# Patient Record
Sex: FEMALE | Race: WHITE | Employment: FULL TIME | URBAN - METROPOLITAN AREA
[De-identification: names, ages, dates, MRNs, and addresses within clinical notes are randomized per-mention and may not be internally consistent; named-entity substitution may affect disease eponyms.]

---

## 2017-04-20 ENCOUNTER — OFFICE VISIT (OUTPATIENT)
Dept: ONCOLOGY | Age: 62
End: 2017-04-20

## 2017-04-20 VITALS
RESPIRATION RATE: 18 BRPM | WEIGHT: 192 LBS | HEART RATE: 74 BPM | BODY MASS INDEX: 36.28 KG/M2 | TEMPERATURE: 97.1 F | OXYGEN SATURATION: 97 % | SYSTOLIC BLOOD PRESSURE: 151 MMHG | DIASTOLIC BLOOD PRESSURE: 59 MMHG

## 2017-04-20 DIAGNOSIS — Z08 VAGINAL PAP SMEAR FOLLOWING HYSTERECTOMY FOR MALIGNANCY: ICD-10-CM

## 2017-04-20 DIAGNOSIS — Z12.72 SPECIAL SCREENING FOR MALIGNANT NEOPLASMS, VAGINA: ICD-10-CM

## 2017-04-20 DIAGNOSIS — Z85.42 PERSONAL HISTORY OF MALIGNANT NEOPLASM OF OTHER PARTS OF UTERUS: ICD-10-CM

## 2017-04-20 DIAGNOSIS — C54.1 ENDOMETRIAL CANCER (HCC): Primary | ICD-10-CM

## 2017-04-20 DIAGNOSIS — Z90.710 VAGINAL PAP SMEAR FOLLOWING HYSTERECTOMY FOR MALIGNANCY: ICD-10-CM

## 2017-04-20 NOTE — PROGRESS NOTES
[facility]  [address] [place] [address], [address], [address] [phone]  Jing Rowell MD        Patient ID:  Name: Henry Drummond  MRM: [id number]  : 1955/61 y.o. Date: 2017    SUBJECTIVE:    This is a 62 y.o.   female with Stage IA, Grade 1 Endometrial Adenocarcinoma. Pt is status post Total laparoscopic hysterectomy and bilateral salpingo-oophorectomy, Laparoscopic resection of left retroperitoneal mass, Bilateral pelvic lymphadenectomy and Cystoscopy 10/21/14. Last PAP 10/10/16 was satis and neg. Patient is here today for a 6 month f/u. Patient denies any Gyn symptoms. Patient specifically denies any abnormal vaginal bleeding, vaginal discharge, or vaginal irritation. Patient also denies abdominal pain, pelvic pain, or abdominal bloating. Patient is voiding without difficulty and bowel movements are regular. Pathology:    10/23/14    A: UTERUS, BILATERAL OVARIES AND FALLOPIAN TUBES, REMOVAL:  COMPLEX ENDOMETRIAL HYPERPLASIA WITH ATYPIA. MILD CHRONIC CERVICITIS. OVARIES AND FALLOPIAN TUBES WITH NO PATHOLOGIC ABNORMALITY. B: LYMPH NODES, LEFT EXTERNAL ILIAC, DISSECTION:  THREE LYMPH NODES, NEGATIVE FOR MALIGNANCY (0/3). C: LYMPH NODES, LEFT OBTURATOR, DISSECTION:  SIX LYMPH NODES, NEGATIVE FOR MALIGNANCY (0/6). D: LYMPH NODES, RIGHT PELVIC, DISSECTION:  FOUR LYMPH NODES, NEGATIVE FOR MALIGNANCY (0/4). Washings, Pelvic  Satisfactory for evaluation. Chronic inflammation present. No malignant cells found. Complete ROS  All systems were reviewed and are neg. Medications:     Current Outpatient Prescriptions on File Prior to Visit   Medication Sig Dispense Refill    pantoprazole (PROTONIX) 40 mg tablet   6    ibuprofen 200 mg cap Take 200 mg by mouth as needed for Pain.  zolpidem (AMBIEN) 10 mg tablet Take 5 mg by mouth nightly as needed for Sleep.  Indications: SLEEP-ONSET INSOMNIA      pravastatin (PRAVACHOL) 10 mg tablet Take 10 mg by mouth nightly. Indications: HYPERCHOLESTEROLEMIA      cyclobenzaprine (FLEXERIL) 10 mg tablet Take  by mouth three (3) times daily as needed for Muscle Spasm(s).  naproxen sodium (ALEVE) 220 mg cap Take  by mouth as needed for Pain.  lorazepam (ATIVAN) 1 mg tablet Take  by mouth every four (4) hours as needed for Anxiety.  Dexlansoprazole (DEXILANT) 60 mg CpDB Take  by mouth daily. Pt instructed to take am of surgery. Indications: GASTROESOPHAGEAL REFLUX, Duncan's esophagus       No current facility-administered medications on file prior to visit. Past Medical History:   Diagnosis Date    Asthma     Cancer ([facility] 2014    uterine    Gastroparesis     GERD (gastroesophageal reflux disease)     duncan's esophagus    Hiatal hernia     Hypercholesterolemia     Nausea & vomiting     Psychiatric disorder     anxiety     Past Surgical History:   Procedure Laterality Date    HX CHOLECYSTECTOMY      HX DILATION AND CURETTAGE      HX DILATION AND CURETTAGE  9-2014    HX TONSILLECTOMY  1962    HX TUBAL LIGATION       Family History   Problem Relation Age of Onset    Cancer Mother     Cancer Father     Heart Disease Father     Breast Problems Maternal Grandmother      Social History   Substance Use Topics    Smoking status: Former Smoker     Types: Cigarettes     Quit date: 10/14/1998    Smokeless tobacco: Never Used    Alcohol use Yes      Comment: once every 2 weeks       Allergies:      Allergies   Allergen Reactions    Acyclovir Hives    Keflex [Cephalexin] Hives and Itching       OBJECTIVE:    Vitals:   Visit Vitals    /59    Pulse 74    Temp 97.1 °F (36.2 °C) (Oral)    Resp 18    Wt 87.1 kg (192 lb)    SpO2 97%    BMI 36.28 kg/m2       Physical Examination:    General:  alert, cooperative, no distress   Lungs: lungs clear to auscultation   Cardiac: Regular rate and rhythm   Abdomen: soft, bowel sounds active, non-tender   Incision: Scaly lesion with erythematous border around LLQ incision   Pelvic: NEFG, normal vaginal mucosa without discharge, vaginal cytology performed, cervix uterus and adnexa surgically absent   Rectal: not done   Extremity:   RLE lymphedema. No swelling, warmth, erythema, pulses palpable. LLE normal, atraumatic, no cyanosis or edema     IMPRESSION/PLAN:    Stage IA, Grade 1 Endometrioid adenocarcinoma, JOI  No residual malignancy on final path. Pelvic and cytology performed today. Follow up in 6 months for endometrial cancer surveillance. Patient verbalized understanding of information provided today. All questions answered. Patient agrees with plan of care. The patient is advised to call our office with any problems or concerns. Karlee eDan.  Francisco DALE  Gynecologic Oncology  4/20/2017/2:13 PM

## 2017-04-20 NOTE — MR AVS SNAPSHOT
Visit Information Date & Time Provider Department Dept. Phone Encounter #  
 4/20/2017  9:15 AM [id number] [facility] [phone] Upcoming Health Maintenance Date Due Hepatitis C Screening 1955 Pneumococcal 19-64 Highest Risk (1 of 3 - PCV13) 8/24/1975 DTaP/Tdap/Td series (1 - Tdap) 8/24/1977 BREAST CANCER SCRN MAMMOGRAM 8/24/2006 FOBT Q 1 YEAR AGE 50-75 8/24/2006 INFLUENZA AGE 9 TO ADULT 8/1/2016 ZOSTER VACCINE AGE 60> 8/24/2016 PAP AKA CERVICAL CYTOLOGY 10/10/2019 Allergies as of 4/20/2017  Review Complete On: 4/20/2017 By: Bill Bosch LPN Severity Noted Reaction Type Reactions Acyclovir  10/06/2014   Topical Hives Keflex [Cephalexin]  10/06/2014   Topical Hives, Itching Current Immunizations  Never Reviewed No immunizations on file. Not reviewed this visit You Were Diagnosed With   
  
 Codes Comments [facility] ([facility]    -  Primary ICD-10-CM: C54.1 ICD-9-CM: 182.0 Special screening for malignant neoplasms, vagina     ICD-10-CM: Z12.72 
ICD-9-CM: V76.47 Vaginal Pap smear following hysterectomy for malignancy     ICD-10-CM: Z08, Z90.710 ICD-9-CM: V67.01 Personal history of malignant neoplasm of other parts of uterus     ICD-10-CM: Z85.42 
ICD-9-CM: V10.42 Vitals BP Pulse Temp Resp Weight(growth percentile) SpO2  
 151/59 74 97.1 °F (36.2 °C) (Oral) 18 192 lb (87.1 kg) 97% BMI OB Status Smoking Status 36.28 kg/m2 Hysterectomy Former Smoker BMI and BSA Data Body Mass Index Body Surface Area  
 36.28 kg/m 2 1.94 m 2 Preferred Pharmacy Pharmacy Name Phone [id number] - [place], [address] [phone] Your Updated Medication List  
  
   
This list is accurate as of: 4/20/17 10:00 AM.  Always use your most recent med list.  
  
  
  
  
 ALEVE 220 mg Cap Generic drug:  naproxen sodium Take  by mouth as needed for Pain. AMBIEN 10 mg tablet Generic drug:  zolpidem Take 5 mg by mouth nightly as needed for Sleep. Indications: SLEEP-ONSET INSOMNIA DEXILANT 60 mg Cpdb Generic drug:  Dexlansoprazole Take  by mouth daily. Pt instructed to take am of surgery. Indications: GASTROESOPHAGEAL REFLUX, Em's esophagus FLEXERIL 10 mg tablet Generic drug:  cyclobenzaprine Take  by mouth three (3) times daily as needed for Muscle Spasm(s). ibuprofen 200 mg Cap Take 200 mg by mouth as needed for Pain. LORazepam 1 mg tablet Commonly known as:  ATIVAN Take  by mouth every four (4) hours as needed for Anxiety. pantoprazole 40 mg tablet Commonly known as:  PROTONIX  
  
 PRAVACHOL 10 mg tablet Generic drug:  pravastatin Take 10 mg by mouth nightly. Indications: HYPERCHOLESTEROLEMIA To-Do List   
 04/20/2017 Pathology:  PAP (IMAGE GUIDED), LIQUID-BASED Introducing [facility]! Dear Piero Garduno: 
Thank you for requesting a [employer] account. Our records indicate that you already have an active [employer] account. You can access your account anytime at [email or web address] Did you know that you can access your hospital and ER discharge instructions at any time in [employer]? You can also review all of your test results from your hospital stay or ER visit. Additional Information If you have questions, please visit the Frequently Asked Questions section of the [employer] website at [email or web address] Remember, [employer] is NOT to be used for urgent needs. For medical emergencies, dial 911. Now available from your iPhone and Android! Please provide this summary of care documentation to your next provider. Your primary care clinician is listed as [employer]. If you have any questions after today's visit, please call [phone].

## 2017-04-24 LAB
CYTOLOGIST CVX/VAG CYTO: NORMAL
CYTOLOGY CVX/VAG DOC THIN PREP: NORMAL
Lab: NORMAL
OTHER STN SPEC: NORMAL
PATH REPORT.FINAL DX SPEC: NORMAL
STAT OF ADQ CVX/VAG CYTO-IMP: NORMAL

## 2017-09-12 ENCOUNTER — OFFICE VISIT (OUTPATIENT)
Dept: ONCOLOGY | Age: 62
End: 2017-09-12

## 2017-09-12 VITALS
HEIGHT: 61 IN | HEART RATE: 71 BPM | BODY MASS INDEX: 37 KG/M2 | WEIGHT: 196 LBS | SYSTOLIC BLOOD PRESSURE: 137 MMHG | DIASTOLIC BLOOD PRESSURE: 80 MMHG | OXYGEN SATURATION: 100 % | TEMPERATURE: 96.3 F

## 2017-09-12 DIAGNOSIS — Z85.42 HISTORY OF ENDOMETRIAL CANCER: Primary | ICD-10-CM

## 2017-09-12 RX ORDER — LEVOCETIRIZINE DIHYDROCHLORIDE 5 MG/1
TABLET, FILM COATED ORAL
COMMUNITY

## 2017-09-12 NOTE — PROGRESS NOTES
[facility]  [address] [place] [address], [address], [address] [phone]  Joie Lyons DO        Patient ID:  Name: Yefri Edge  MRM: [id number]  : 1955/62 y.o. Date: 2017    SUBJECTIVE:    This is a 65 y.o.   female with Stage IA, Grade 1 Endometrial Adenocarcinoma. Pt is status post Total laparoscopic hysterectomy and bilateral salpingo-oophorectomy, Laparoscopic resection of left retroperitoneal mass, Bilateral pelvic lymphadenectomy and Cystoscopy 10/21/14. Patient is here today for a 6 month f/u. Patient denies any Gyn symptoms. Patient specifically denies any abnormal vaginal bleeding, vaginal discharge, or vaginal irritation. Patient also denies abdominal pain, pelvic pain, or abdominal bloating. Patient is voiding without difficulty and bowel movements are regular. Pt states her leg swelling has improved    Pathology:    10/23/14    A: UTERUS, BILATERAL OVARIES AND FALLOPIAN TUBES, REMOVAL:  COMPLEX ENDOMETRIAL HYPERPLASIA WITH ATYPIA. MILD CHRONIC CERVICITIS. OVARIES AND FALLOPIAN TUBES WITH NO PATHOLOGIC ABNORMALITY. B: LYMPH NODES, LEFT EXTERNAL ILIAC, DISSECTION:  THREE LYMPH NODES, NEGATIVE FOR MALIGNANCY (0/3). C: LYMPH NODES, LEFT OBTURATOR, DISSECTION:  SIX LYMPH NODES, NEGATIVE FOR MALIGNANCY (0/6). D: LYMPH NODES, RIGHT PELVIC, DISSECTION:  FOUR LYMPH NODES, NEGATIVE FOR MALIGNANCY (0/4). Washings, Pelvic  Satisfactory for evaluation. Chronic inflammation present. No malignant cells found. Complete ROS  All systems were reviewed and are neg. Medications:     Current Outpatient Prescriptions on File Prior to Visit   Medication Sig Dispense Refill    pantoprazole (PROTONIX) 40 mg tablet   6    ibuprofen 200 mg cap Take 200 mg by mouth as needed for Pain.  zolpidem (AMBIEN) 10 mg tablet Take 5 mg by mouth nightly as needed for Sleep.  Indications: SLEEP-ONSET INSOMNIA  Dexlansoprazole (DEXILANT) 60 mg CpDB Take  by mouth daily. Pt instructed to take am of surgery. Indications: GASTROESOPHAGEAL REFLUX, Duncan's esophagus      pravastatin (PRAVACHOL) 10 mg tablet Take 10 mg by mouth nightly. Indications: HYPERCHOLESTEROLEMIA      cyclobenzaprine (FLEXERIL) 10 mg tablet Take  by mouth three (3) times daily as needed for Muscle Spasm(s).  naproxen sodium (ALEVE) 220 mg cap Take  by mouth as needed for Pain.  lorazepam (ATIVAN) 1 mg tablet Take  by mouth every four (4) hours as needed for Anxiety. No current facility-administered medications on file prior to visit. Past Medical History:   Diagnosis Date    Asthma     Cancer ([facility] 2014    uterine    Gastroparesis     GERD (gastroesophageal reflux disease)     duncan's esophagus    Hiatal hernia     Hypercholesterolemia     Nausea & vomiting     Psychiatric disorder     anxiety     Past Surgical History:   Procedure Laterality Date    HX CHOLECYSTECTOMY      HX DILATION AND CURETTAGE      HX DILATION AND CURETTAGE  9-2014    HX TONSILLECTOMY  1962    HX TUBAL LIGATION       Family History   Problem Relation Age of Onset    Cancer Mother     Cancer Father     Heart Disease Father     Breast Problems Maternal Grandmother      Social History   Substance Use Topics    Smoking status: Former Smoker     Types: Cigarettes     Quit date: 10/14/1998    Smokeless tobacco: Never Used    Alcohol use Yes      Comment: once every 2 weeks       Allergies: Allergies   Allergen Reactions    Acyclovir Hives    Keflex [Cephalexin] Hives and Itching       OBJECTIVE:    Vitals: There were no vitals taken for this visit.     Physical Examination:    General:  alert, cooperative, no distress   Lungs: lungs clear to auscultation   Cardiac: Regular rate and rhythm   Abdomen: soft, bowel sounds active, non-tender   Incision: Scaly lesion with erythematous border around LLQ incision   Pelvic: NEFG, normal vaginal mucosa without discharge, vaginal cytology performed, cervix uterus and adnexa surgically absent   Rectal: not done   Extremity:   RLE lymphedema improved No swelling, warmth, erythema, pulses palpable. LLE normal, atraumatic, no cyanosis or edema     IMPRESSION/PLAN:    Stage IA, Grade 1 Endometrioid adenocarcinoma, JOI  No residual malignancy on final path. Follow up in 6 months for endometrial cancer surveillance. Patient verbalized understanding of information provided today. All questions answered. Patient agrees with plan of care. The patient is advised to call our office with any problems or concerns. Total time spent was 25 minutes regarding diagnosis of endometrial cancer and >50% of this time was spent counseling and coordinating care.       Pacheco Berry DO  Gynecologic Oncology  9/12/2017/2:13 PM

## 2018-03-12 ENCOUNTER — OFFICE VISIT (OUTPATIENT)
Dept: ONCOLOGY | Age: 63
End: 2018-03-12

## 2018-03-12 VITALS
OXYGEN SATURATION: 92 % | HEIGHT: 61 IN | DIASTOLIC BLOOD PRESSURE: 70 MMHG | WEIGHT: 187.8 LBS | TEMPERATURE: 95.1 F | SYSTOLIC BLOOD PRESSURE: 139 MMHG | HEART RATE: 66 BPM | RESPIRATION RATE: 14 BRPM | BODY MASS INDEX: 35.45 KG/M2

## 2018-03-12 DIAGNOSIS — Z85.42 HISTORY OF ENDOMETRIAL CANCER: Primary | ICD-10-CM

## 2018-03-12 NOTE — PROGRESS NOTES
Mike Carrasco, a 65 y.o. female,  is here for   Chief Complaint   Patient presents with    Uterine Cancer     6 month surveillance       Visit Vitals    /70 (BP 1 Location: Right arm, BP Patient Position: Sitting)    Pulse 66    Temp 95.1 °F (35.1 °C) (Oral)    Resp 14    Ht 5' 1\" (1.549 m)    Wt 85.2 kg (187 lb 12.8 oz)    SpO2 92%    BMI 35.48 kg/m2       Patient denies any persistent or worsening abdominal or pelvic pain. Denies any unusual vaginal bleeding, discharge, irritation, or odor. Denies experiencing any constipation or diarrhea. No burning, discomfort, or irritation with urination. 1. Have you been to the ER, urgent care clinic since your last visit? Hospitalized since your last visit? No    2. Have you seen or consulted any other health care providers outside of the [address] [place] [address] since your last visit? Include any pap smears or colon screening.  No

## 2018-03-12 NOTE — PROGRESS NOTES
[facility]  [address] [place] [address], [address], [address] [place]  [phone]  [facility]          Patient ID:  Name: Arnoldo Lincoln  MRM: [id number]  : 62 y.o. Date: 3/12/2018    SUBJECTIVE:    This is a 65 y.o.   female with Stage IA, Grade 1 Endometrial Adenocarcinoma. Patient is status post Total laparoscopic hysterectomy and bilateral salpingo-oophorectomy, Laparoscopic resection of left retroperitoneal mass, Bilateral pelvic lymphadenectomy and Cystoscopy 10/21/14. Patient is here today for a 6 month surveillance visit. Patient denies any Gyn symptoms. Patient specifically denies any abnormal vaginal bleeding, vaginal discharge, or vaginal irritation. Patient also denies abdominal pain, pelvic pain, or abdominal bloating. Patient is voiding without difficulty and bowel movements are regular. Pathology:    10/23/14    A: UTERUS, BILATERAL OVARIES AND FALLOPIAN TUBES, REMOVAL:  COMPLEX ENDOMETRIAL HYPERPLASIA WITH ATYPIA. MILD CHRONIC CERVICITIS. OVARIES AND FALLOPIAN TUBES WITH NO PATHOLOGIC ABNORMALITY. B: LYMPH NODES, LEFT EXTERNAL ILIAC, DISSECTION:  THREE LYMPH NODES, NEGATIVE FOR MALIGNANCY (0/3). C: LYMPH NODES, LEFT OBTURATOR, DISSECTION:  SIX LYMPH NODES, NEGATIVE FOR MALIGNANCY (0/6). D: LYMPH NODES, RIGHT PELVIC, DISSECTION:  FOUR LYMPH NODES, NEGATIVE FOR MALIGNANCY (0/4). Washings, Pelvic  Satisfactory for evaluation. Chronic inflammation present. No malignant cells found. Complete ROS  All systems were reviewed and are neg. Medications:     Current Outpatient Prescriptions on File Prior to Visit   Medication Sig Dispense Refill    levocetirizine (XYZAL) 5 mg tablet Take  by mouth daily as needed for Allergies.  pantoprazole (PROTONIX) 40 mg tablet   6    ibuprofen 200 mg cap Take 200 mg by mouth as needed for Pain.       pravastatin (PRAVACHOL) 10 mg tablet Take 10 mg by mouth nightly. Indications: HYPERCHOLESTEROLEMIA      cyclobenzaprine (FLEXERIL) 10 mg tablet Take  by mouth three (3) times daily as needed for Muscle Spasm(s).  naproxen sodium (ALEVE) 220 mg cap Take  by mouth as needed for Pain.  lorazepam (ATIVAN) 1 mg tablet Take  by mouth every four (4) hours as needed for Anxiety.  zolpidem (AMBIEN) 10 mg tablet Take 5 mg by mouth nightly as needed for Sleep. Indications: SLEEP-ONSET INSOMNIA      Dexlansoprazole (DEXILANT) 60 mg CpDB Take  by mouth daily. Pt instructed to take am of surgery. Indications: GASTROESOPHAGEAL REFLUX, Duncan's esophagus       No current facility-administered medications on file prior to visit. Past Medical History:   Diagnosis Date    Asthma     Cancer ([facility] 2014    uterine    Gastroparesis     GERD (gastroesophageal reflux disease)     duncan's esophagus    Hiatal hernia     Hypercholesterolemia     Nausea & vomiting     Psychiatric disorder     anxiety    Sinusitis 02/2017     Past Surgical History:   Procedure Laterality Date    HX CHOLECYSTECTOMY      HX DILATION AND CURETTAGE      HX DILATION AND CURETTAGE  9-2014    HX TONSILLECTOMY  1962    HX TUBAL LIGATION       Family History   Problem Relation Age of Onset    Cancer Mother     Cancer Father     Heart Disease Father     Breast Problems Maternal Grandmother      Social History   Substance Use Topics    Smoking status: Former Smoker     Types: Cigarettes     Quit date: 10/14/1998    Smokeless tobacco: Never Used    Alcohol use No       Allergies:      Allergies   Allergen Reactions    Acyclovir Hives    Keflex [Cephalexin] Hives and Itching       OBJECTIVE:    Vitals:   Visit Vitals    /70 (BP 1 Location: Right arm, BP Patient Position: Sitting)    Pulse 66    Temp 95.1 °F (35.1 °C) (Oral)    Resp 14    Ht 5' 1\" (1.549 m)    Wt 85.2 kg (187 lb 12.8 oz)    LMP  (LMP Unknown)    SpO2 92%    BMI 35.48 kg/m2       Physical Examination:    General:  alert, cooperative, no distress   Lungs: lungs clear to auscultation   Cardiac: Regular rate and rhythm   Abdomen: soft, bowel sounds active, non-tender   Incision: Scaly lesion with erythematous border around LLQ incision   Pelvic: NEFG, normal vaginal mucosa without discharge, vaginal cytology performed, cervix uterus and adnexa surgically absent   Rectal: not done   Extremity:   RLE lymphedema improved No swelling, warmth, erythema, pulses palpable. LLE normal, atraumatic, no cyanosis or edema     IMPRESSION/PLAN:    Stage IA, Grade 1 Endometrioid adenocarcinoma, JOI  No residual malignancy on final path. Follow up in 6 months for endometrial cancer surveillance. Patient verbalized understanding of information provided today. All questions answered. Patient agrees with plan of care. The patient is advised to call our office with any problems or concerns. Total time spent was 25 minutes regarding diagnosis of endometrial cancer and >50% of this time was spent counseling and coordinating care.       Laren Schilder [facility]  Gynecologic Oncology  3/12/2018/3:40 PM

## 2018-03-12 NOTE — PATIENT INSTRUCTIONS
Below is some information on the condition you previously were diagnosed with. Please call the office ASAP if you begin to experience the following symptoms: Persistent or worsening abdominal or pelvic pain, any unusual vaginal bleeding, discharge, odor, or irritation, and any changes in bladder or bowel habits such as constipation, diarrhea, burning, or general discomfort. Please call the office if you have any other questions or concerns. Uterine Cancer: Care Instructions  Your Care Instructions  Uterine cancer is the rapid growth of abnormal cells that line the uterus. It also is called endometrial cancer. These cells may spread to nearby organs, lymph glands, or distant organs. Uterine cancer can be cured most often when found early. Treatment may include surgery to remove the uterus, ovaries, fallopian tubes, and sometimes the pelvic lymph nodes. Radiation and hormones to stop cancer growth also are sometimes used. Chemotherapy may be used if the cancer has spread. Having cancer can be scary. You may feel many emotions and may need some help coping. Seek out family, friends, and counselors for support. You can do things at home to make yourself feel better while you go through treatment. You also can call the [facility] (7-121.755.4197) or visit its website at [address] for more information. Follow-up care is a key part of your treatment and safety. Be sure to make and go to all appointments, and call your doctor if you are having problems. It's also a good idea to know your test results and keep a list of the medicines you take. How can you care for yourself at home? · Take your medicines exactly as prescribed. Call your doctor if you think you are having a problem with your medicine. You may get medicine for nausea and vomiting if you have these side effects. · Eat healthy food.  If you do not feel like eating, try to eat food that has protein and extra calories to keep up your strength and prevent weight loss. Drink liquid meal replacements for extra calories and protein. Try to eat your main meal early. · Get some physical activity every day, but do not get too tired. Keep doing the hobbies you enjoy as your energy allows. · Take steps to control your stress and workload. Learn relaxation techniques. ¨ Share your feelings. Stress and tension affect our emotions. By expressing your feelings to others, you may be able to understand and cope with them. ¨ Consider joining a support group. Talking about a problem with your spouse, a good friend, or other people with similar problems is a good way to reduce tension and stress. ¨ Express yourself through art. Try writing, dance, art, or crafts to relieve tension. Some dance, writing, or art groups may be available just for people who have cancer. ¨ Be kind to your body and mind. Getting enough sleep, eating a healthy diet, and taking time to do things you enjoy can contribute to an overall feeling of balance in your life and help reduce stress. ¨ Get help if you need it. Discuss your concerns with your doctor or counselor. · If you are vomiting or have diarrhea:  ¨ Drink plenty of fluids (enough so that your urine is light yellow or clear like water) to prevent dehydration. Choose water and other caffeine-free clear liquids. If you have kidney, heart, or liver disease and have to limit fluids, talk with your doctor before you increase the amount of fluids you drink. ¨ When you are able to eat, try clear soups, mild foods, and liquids until all symptoms are gone for 12 to 48 hours. Other good choices include dry toast, crackers, cooked cereal, and gelatin dessert, such as Jell-O.  · Take care of your urinary tract to prevent problems such as infection, which can be caused by uterine cancer and its treatment. Limit drinks with caffeine, drink plenty of fluids, and urinate every 3 to 4 hours.   · If you have not already done so, prepare a list of advance directives. Advance directives are instructions to your doctor and family members about what kind of care you want if you become unable to speak or express yourself. When should you call for help? Call 911 anytime you think you may need emergency care. For example, call if:  ? · You passed out (lost consciousness). ?Call your doctor now or seek immediate medical care if:  ? · You have a fever. ? · You have abnormal bleeding. ? · You have new or worse pain. ? · You think you have an infection. ? · You have new symptoms, such as a cough, belly pain, vomiting, diarrhea, or a rash. ? Watch closely for changes in your health, and be sure to contact your doctor if:  ? · You are much more tired than usual.   ? · You have swollen glands in your armpits, groin, or neck. ? · You do not get better as expected. Where can you learn more? Go to http://cori-penelope.info/. Enter E343 in the search box to learn more about \"Uterine Cancer: Care Instructions. \"  Current as of: May 12, 2017  Content Version: 11.4  © 7145-8105 "[employer]. Care instructions adapted under license by [employer] (which disclaims liability or warranty for this information). If you have questions about a medical condition or this instruction, always ask your healthcare professional. [address] any warranty or liability for your use of this information.

## 2018-09-10 ENCOUNTER — OFFICE VISIT (OUTPATIENT)
Dept: ONCOLOGY | Age: 63
End: 2018-09-10

## 2018-09-10 VITALS
WEIGHT: 186.4 LBS | OXYGEN SATURATION: 99 % | TEMPERATURE: 96.6 F | HEART RATE: 71 BPM | SYSTOLIC BLOOD PRESSURE: 139 MMHG | DIASTOLIC BLOOD PRESSURE: 76 MMHG | RESPIRATION RATE: 18 BRPM | BODY MASS INDEX: 35.19 KG/M2 | HEIGHT: 61 IN

## 2018-09-10 DIAGNOSIS — Z85.42 HISTORY OF ENDOMETRIAL CANCER: Primary | ICD-10-CM

## 2018-09-10 PROBLEM — E66.01 SEVERE OBESITY (BMI 35.0-39.9): Status: ACTIVE | Noted: 2018-09-10

## 2018-09-10 NOTE — PATIENT INSTRUCTIONS
Below you will find information on the condition you were previously diagnosed with. Please call the office as soon as possible if you begin to experience the following symptoms: Persistent or worsening abdominal or pelvic pain, any unusual vaginal bleeding, discharge, odor, or irritation, and any changes in bladder or bowel habits such as constipation, diarrhea, burning, or general discomfort. Please call the office if you have any other questions or concerns. Uterine Cancer: Care Instructions  Your Care Instructions  Uterine cancer is the rapid growth of abnormal cells that line the uterus. It also is called endometrial cancer. These cells may spread to nearby organs, lymph glands, or distant organs. Uterine cancer can be cured most often when found early. Treatment may include surgery to remove the uterus, ovaries, fallopian tubes, and sometimes the pelvic lymph nodes. Radiation and hormones to stop cancer growth also are sometimes used. Chemotherapy may be used if the cancer has spread. Having cancer can be scary. You may feel many emotions and may need some help coping. Seek out family, friends, and counselors for support. You can do things at home to make yourself feel better while you go through treatment. You also can call the [facility] (4-695.224.8309) or visit its website at [address] [employer]. [place] for more information. Follow-up care is a key part of your treatment and safety. Be sure to make and go to all appointments, and call your doctor if you are having problems. It's also a good idea to know your test results and keep a list of the medicines you take. How can you care for yourself at home? · Take your medicines exactly as prescribed. Call your doctor if you think you are having a problem with your medicine. You may get medicine for nausea and vomiting if you have these side effects. · Eat healthy food.  If you do not feel like eating, try to eat food that has protein and extra calories to keep up your strength and prevent weight loss. Drink liquid meal replacements for extra calories and protein. Try to eat your main meal early. · Get some physical activity every day, but do not get too tired. Keep doing the hobbies you enjoy as your energy allows. · Take steps to control your stress and workload. Learn relaxation techniques. ¨ Share your feelings. Stress and tension affect our emotions. By expressing your feelings to others, you may be able to understand and cope with them. ¨ Consider joining a support group. Talking about a problem with your spouse, a good friend, or other people with similar problems is a good way to reduce tension and stress. ¨ Express yourself through art. Try writing, dance, art, or crafts to relieve tension. Some dance, writing, or art groups may be available just for people who have cancer. ¨ Be kind to your body and mind. Getting enough sleep, eating a healthy diet, and taking time to do things you enjoy can contribute to an overall feeling of balance in your life and help reduce stress. ¨ Get help if you need it. Discuss your concerns with your doctor or counselor. · If you are vomiting or have diarrhea:  ¨ Drink plenty of fluids (enough so that your urine is light yellow or clear like water) to prevent dehydration. Choose water and other caffeine-free clear liquids. If you have kidney, heart, or liver disease and have to limit fluids, talk with your doctor before you increase the amount of fluids you drink. ¨ When you are able to eat, try clear soups, mild foods, and liquids until all symptoms are gone for 12 to 48 hours. Other good choices include dry toast, crackers, cooked cereal, and gelatin dessert, such as Jell-O.  · Take care of your urinary tract to prevent problems such as infection, which can be caused by uterine cancer and its treatment. Limit drinks with caffeine, drink plenty of fluids, and urinate every 3 to 4 hours.   · If you have not already done so, prepare a list of advance directives. Advance directives are instructions to your doctor and family members about what kind of care you want if you become unable to speak or express yourself. When should you call for help? Call 911 anytime you think you may need emergency care. For example, call if:    · You passed out (lost consciousness).    Call your doctor now or seek immediate medical care if:    · You have a fever.     · You have abnormal bleeding.     · You have new or worse pain.     · You think you have an infection.     · You have new symptoms, such as a cough, belly pain, vomiting, diarrhea, or a rash.    Watch closely for changes in your health, and be sure to contact your doctor if:    · You are much more tired than usual.     · You have swollen glands in your armpits, groin, or neck.     · You do not get better as expected. Where can you learn more? Go to http://cori-penelope.info/. Enter E343 in the search box to learn more about \"Uterine Cancer: Care Instructions. \"  Current as of: May 12, 2017  Content Version: 11.7  © 0017-0972 [employer]. Care instructions adapted under license by [employer] (which disclaims liability or warranty for this information). If you have questions about a medical condition or this instruction, always ask your healthcare professional. [address] any warranty or liability for your use of this information.

## 2018-09-10 NOTE — MR AVS SNAPSHOT
[facility] 
 
 
 [facility] [address] [place] [address] 
[phone] Patient: Felix Whitman MRN: [id number] Visit Information Date & Time Provider Department Dept. Phone Encounter #  
 9/10/2018  3:30 PM Hernan Canales NP [facility] [phone] [id number] Follow-up Instructions Return in about 6 months (around 3/10/2019). Follow-up and Disposition History Your Appointments 3/11/2019  3:30 PM  
Office Visit with Hernan Canales NP [facility] ([facility]) Appt Note: 6 m f/u, pt requested 330 or 4 pm  
 [address] Upcoming Health Maintenance Date Due Hepatitis C Screening 1955 Pneumococcal 19-64 Highest Risk (1 of 3 - PCV13) 8/24/1975 DTaP/Tdap/Td series (1 - Tdap) 8/24/1977 BREAST CANCER SCRN MAMMOGRAM 8/24/2006 FOBT Q 1 YEAR AGE 50-75 8/24/2006 ZOSTER VACCINE AGE 60> 6/24/2016 Influenza Age 5 to Adult 8/1/2018 PAP AKA CERVICAL CYTOLOGY 4/21/2020 Allergies as of 9/10/2018  Review Complete On: 9/10/2018 By: Hernan Canales NP Severity Noted Reaction Type Reactions Acyclovir  10/06/2014   Topical Hives Keflex [Cephalexin]  10/06/2014   Topical Hives, Itching Current Immunizations  Never Reviewed No immunizations on file. Not reviewed this visit You Were Diagnosed With   
  
 Codes Comments History of endometrial cancer    -  Primary ICD-10-CM: Z85.42 
ICD-9-CM: V10.42 Vitals BP Pulse Temp Resp Height(growth percentile) Weight(growth percentile) 139/76 (BP 1 Location: Right arm, BP Patient Position: Sitting) 71 96.6 °F (35.9 °C) (Oral) 18 5' 1\" (1.549 m) 186 lb 6.4 oz (84.6 kg) LMP SpO2 BMI OB Status Smoking Status (LMP Unknown) 99% 35.22 kg/m2 Hysterectomy Former Smoker BMI and BSA Data Body Mass Index Body Surface Area  
 35.22 kg/m 2 1.91 m 2 Your Updated Medication List  
  
   
This list is accurate as of 9/10/18  4:21 PM.  Always use your most recent med list.  
  
  
  
  
 ALEVE 220 mg Cap Generic drug:  naproxen sodium Take  by mouth as needed for Pain. FLEXERIL 10 mg tablet Generic drug:  cyclobenzaprine Take  by mouth three (3) times daily as needed for Muscle Spasm(s). pantoprazole 40 mg tablet Commonly known as:  PROTONIX  
  
 PRAVACHOL 10 mg tablet Generic drug:  pravastatin Take 10 mg by mouth nightly. Indications: HYPERCHOLESTEROLEMIA  
  
 XYZAL 5 mg tablet Generic drug:  levocetirizine Take  by mouth daily as needed for Allergies. Follow-up Instructions Return in about 6 months (around 3/10/2019). Patient Instructions Below you will find information on the condition you were previously diagnosed with. Please call the office as soon as possible if you begin to experience the following symptoms: Persistent or worsening abdominal or pelvic pain, any unusual vaginal bleeding, discharge, odor, or irritation, and any changes in bladder or bowel habits such as constipation, diarrhea, burning, or general discomfort. Please call the office if you have any other questions or concerns. Uterine Cancer: Care Instructions Your Care Instructions Uterine cancer is the rapid growth of abnormal cells that line the uterus. It also is called endometrial cancer. These cells may spread to nearby organs, lymph glands, or distant organs. Uterine cancer can be cured most often when found early. Treatment may include surgery to remove the uterus, ovaries, fallopian tubes, and sometimes the pelvic lymph nodes. Radiation and hormones to stop cancer growth also are sometimes used. Chemotherapy may be used if the cancer has spread. Having cancer can be scary. You may feel many emotions and may need some help coping. Seek out family, friends, and counselors for support. You can do things at home to make yourself feel better while you go through treatment. You also can call the [facility] (2-516.202.6877) or visit its website at [address]. [employer] for more information. Follow-up care is a key part of your treatment and safety. Be sure to make and go to all appointments, and call your doctor if you are having problems. It's also a good idea to know your test results and keep a list of the medicines you take. How can you care for yourself at home? · Take your medicines exactly as prescribed. Call your doctor if you think you are having a problem with your medicine. You may get medicine for nausea and vomiting if you have these side effects. · Eat healthy food. If you do not feel like eating, try to eat food that has protein and extra calories to keep up your strength and prevent weight loss. Drink liquid meal replacements for extra calories and protein. Try to eat your main meal early. · Get some physical activity every day, but do not get too tired. Keep doing the hobbies you enjoy as your energy allows. · Take steps to control your stress and workload. Learn relaxation techniques. ¨ Share your feelings. Stress and tension affect our emotions. By expressing your feelings to others, you may be able to understand and cope with them. ¨ Consider joining a support group. Talking about a problem with your spouse, a good friend, or other people with similar problems is a good way to reduce tension and stress. ¨ Express yourself through art. Try writing, dance, art, or crafts to relieve tension. Some dance, writing, or art groups may be available just for people who have cancer. ¨ Be kind to your body and mind.  Getting enough sleep, eating a healthy diet, and taking time to do things you enjoy can contribute to an overall feeling of balance in your life and help reduce stress. ¨ Get help if you need it. Discuss your concerns with your doctor or counselor. · If you are vomiting or have diarrhea: ¨ Drink plenty of fluids (enough so that your urine is light yellow or clear like water) to prevent dehydration. Choose water and other caffeine-free clear liquids. If you have kidney, heart, or liver disease and have to limit fluids, talk with your doctor before you increase the amount of fluids you drink. ¨ When you are able to eat, try clear soups, mild foods, and liquids until all symptoms are gone for 12 to 48 hours. Other good choices include dry toast, crackers, cooked cereal, and gelatin dessert, such as Jell-O. 
· Take care of your urinary tract to prevent problems such as infection, which can be caused by uterine cancer and its treatment. Limit drinks with caffeine, drink plenty of fluids, and urinate every 3 to 4 hours. · If you have not already done so, prepare a list of advance directives. Advance directives are instructions to your doctor and family members about what kind of care you want if you become unable to speak or express yourself. When should you call for help? Call 911 anytime you think you may need emergency care. For example, call if: 
  · You passed out (lost consciousness).  
 Call your doctor now or seek immediate medical care if: 
  · You have a fever.  
  · You have abnormal bleeding.  
  · You have new or worse pain.  
  · You think you have an infection.  
  · You have new symptoms, such as a cough, belly pain, vomiting, diarrhea, or a rash.  
 Watch closely for changes in your health, and be sure to contact your doctor if: 
  · You are much more tired than usual.  
  · You have swollen glands in your armpits, groin, or neck.  
  · You do not get better as expected. Where can you learn more? Go to http://cori-penelope.info/. Enter E343 in the search box to learn more about \"Uterine Cancer: Care Instructions. \" Current as of: May 12, 2017 Content Version: 11.7 © 9255-7836 [employer]. Care instructions adapted under license by [employer] (which disclaims liability or warranty for this information). If you have questions about a medical condition or this instruction, always ask your healthcare professional. [address] any warranty or liability for your use of this information. Patient Instructions History Introducing [facility]! Dear Olesya Perez: 
Thank you for requesting a [employer] account. Our records indicate that you already have an active [employer] account. You can access your account anytime at [email or web address] - [email or web address] for the rest of us. [email or web address] for the rest of us Did you know that you can access your hospital and ER discharge instructions at any time in [employer]? You can also review all of your test results from your hospital stay or ER visit. Additional Information If you have questions, please visit the Frequently Asked Questions section of the [employer] website at [email or web address] for the rest of us/. Remember, [employer] is NOT to be used for urgent needs. For medical emergencies, dial 911. Now available from your iPhone and Android! Please provide this summary of care documentation to your next provider. Your primary care clinician is listed as [employer]. If you have any questions after today's visit, please call [phone].

## 2018-09-10 NOTE — PROGRESS NOTES
Irais Gallagher, a 59 y.o. female,  is here for   Chief Complaint   Patient presents with    Uterine Cancer     6 month surveillance       Visit Vitals    /76 (BP 1 Location: Right arm, BP Patient Position: Sitting)    Pulse 71    Temp 96.6 °F (35.9 °C) (Oral)    Resp 18    Ht 5' 1\" (1.549 m)    Wt 84.6 kg (186 lb 6.4 oz)    SpO2 99%    BMI 35.22 kg/m2       Patient reports, Patient denies any persistent or worsening abdominal or pelvic pain. Denies any unusual vaginal bleeding, discharge, but has noticed irritation when wiping stated \"maybe its my toliet paper brand\". Denies experiencing any constipation or diarrhea. No burning, discomfort, or irritation with urination. .    1. Have you been to the ER, urgent care clinic since your last visit? Hospitalized since your last visit? Yes When: 08/2018 Where: urgrent care, Reason for visit: left hip    2. Have you seen or consulted any other health care providers outside of the [facility] since your last visit? Include any pap smears or colon screening.  Yes When: 08/2018 Where: PCP [facility] Reason for visit: bursitis

## 2018-09-10 NOTE — PROGRESS NOTES
[facility]  [address] [place] [address], [address], [address] [phone]  [facility]          Patient ID:  Name: Kei Hadley  MRM: [id number]  : 1955/63 y.o. Date: 9/10/2018    SUBJECTIVE:    This is a 59 y.o.   female with Stage IA, Grade 1 Endometrial Adenocarcinoma. Patient is status post Total laparoscopic hysterectomy and bilateral salpingo-oophorectomy, Laparoscopic resection of left retroperitoneal mass, Bilateral pelvic lymphadenectomy and Cystoscopy 10/21/14. Patient is here today for a 6 month surveillance visit. She admits to intermittent vulvar/groin irritation which she attributes to her toilet paper. She denies any abnormal vaginal bleeding, vaginal discharge. Patient also denies abdominal pain, pelvic pain, or abdominal bloating. Patient is voiding without difficulty and bowel movements are regular. She recently had cortisone injections for bursitis in left hip. Pathology:    10/23/14    A: UTERUS, BILATERAL OVARIES AND FALLOPIAN TUBES, REMOVAL:  COMPLEX ENDOMETRIAL HYPERPLASIA WITH ATYPIA. MILD CHRONIC CERVICITIS. OVARIES AND FALLOPIAN TUBES WITH NO PATHOLOGIC ABNORMALITY. B: LYMPH NODES, LEFT EXTERNAL ILIAC, DISSECTION:  THREE LYMPH NODES, NEGATIVE FOR MALIGNANCY (0/3). C: LYMPH NODES, LEFT OBTURATOR, DISSECTION:  SIX LYMPH NODES, NEGATIVE FOR MALIGNANCY (0/6). D: LYMPH NODES, RIGHT PELVIC, DISSECTION:  FOUR LYMPH NODES, NEGATIVE FOR MALIGNANCY (0/4). Washings, Pelvic  Satisfactory for evaluation. Chronic inflammation present. No malignant cells found. Complete ROS  As above    Medications:     Current Outpatient Prescriptions on File Prior to Visit   Medication Sig Dispense Refill    levocetirizine (XYZAL) 5 mg tablet Take  by mouth daily as needed for Allergies.       pantoprazole (PROTONIX) 40 mg tablet   6    pravastatin (PRAVACHOL) 10 mg tablet Take 10 mg by mouth nightly. Indications: HYPERCHOLESTEROLEMIA      cyclobenzaprine (FLEXERIL) 10 mg tablet Take  by mouth three (3) times daily as needed for Muscle Spasm(s).  naproxen sodium (ALEVE) 220 mg cap Take  by mouth as needed for Pain.  ibuprofen 200 mg cap Take 200 mg by mouth as needed for Pain.  lorazepam (ATIVAN) 1 mg tablet Take  by mouth every four (4) hours as needed for Anxiety. No current facility-administered medications on file prior to visit. Past Medical History:   Diagnosis Date    Arthritis     had cortisone shots     Asthma     Cancer ([facility] [address] 2014    uterine    Gastroparesis     GERD (gastroesophageal reflux disease)     duncan's esophagus    Hiatal hernia     Hypercholesterolemia     Nausea & vomiting     Psychiatric disorder     anxiety    Sinusitis 02/2017     Past Surgical History:   Procedure Laterality Date    HX CHOLECYSTECTOMY      HX DILATION AND CURETTAGE      HX DILATION AND CURETTAGE  9-2014    HX TONSILLECTOMY  1962    HX TUBAL LIGATION       Family History   Problem Relation Age of Onset    Cancer Mother     Cancer Father     Heart Disease Father     Breast Problems Maternal Grandmother      Social History   Substance Use Topics    Smoking status: Former Smoker     Types: Cigarettes     Quit date: 10/14/1998    Smokeless tobacco: Never Used    Alcohol use No      Comment: occas. 1x a month       Allergies:      Allergies   Allergen Reactions    Acyclovir Hives    Keflex [Cephalexin] Hives and Itching       OBJECTIVE:    Vitals:   Visit Vitals    /76 (BP 1 Location: Right arm, BP Patient Position: Sitting)    Pulse 71    Temp 96.6 °F (35.9 °C) (Oral)    Resp 18    Ht 5' 1\" (1.549 m)    Wt 84.6 kg (186 lb 6.4 oz)    LMP  (LMP Unknown)    SpO2 99%    BMI 35.22 kg/m2       Physical Examination:    General:  alert, cooperative, no distress   Lungs: lungs clear to auscultation   Cardiac: Regular rate and rhythm   Abdomen: soft, bowel sounds active, non-tender   Incision: well healed   Pelvic: NEFG, normal vaginal mucosa without discharge, cervix uterus and adnexa surgically absent   Rectal: not done   Extremity:   RLE lymphedema improved. No swelling, warmth, erythema, pulses palpable. LLE normal, atraumatic, no cyanosis or edema     IMPRESSION/PLAN:    Stage IA, Grade 1 Endometrioid adenocarcinoma, JOI  No residual malignancy on final path. Follow up in 6 months for endometrial cancer surveillance. Reviewed signs and symptoms of recurrence. Patient verbalized understanding of information provided today. All questions answered. Patient agrees with plan of care. The patient is advised to call our office with any problems or concerns. Total time spent was 25 minutes regarding diagnosis of endometrial cancer and >50% of this time was spent counseling and coordinating care.       Hernan Canales [facility]  Gynecologic Oncology  9/10/2018/3:40 PM

## 2019-03-11 ENCOUNTER — TELEPHONE (OUTPATIENT)
Dept: ONCOLOGY | Age: 64
End: 2019-03-11

## 2019-03-11 NOTE — TELEPHONE ENCOUNTER
Left voicemail for patient to call regarding rescheduling her appointment with NP [facility] for this afternoon to a Dr. Voss Lab day.

## 2019-03-19 ENCOUNTER — OFFICE VISIT (OUTPATIENT)
Dept: ONCOLOGY | Age: 64
End: 2019-03-19

## 2019-03-19 VITALS
HEART RATE: 74 BPM | RESPIRATION RATE: 16 BRPM | WEIGHT: 185.8 LBS | OXYGEN SATURATION: 100 % | BODY MASS INDEX: 35.08 KG/M2 | SYSTOLIC BLOOD PRESSURE: 155 MMHG | TEMPERATURE: 95.6 F | DIASTOLIC BLOOD PRESSURE: 73 MMHG | HEIGHT: 61 IN

## 2019-03-19 DIAGNOSIS — Z85.42 HISTORY OF ENDOMETRIAL CANCER: Primary | ICD-10-CM

## 2019-03-19 NOTE — PATIENT INSTRUCTIONS

## 2019-03-19 NOTE — PROGRESS NOTES
[facility]  [address] [place] [address], [address], [address] [place]  [phone]  [facility]          Patient ID:  Name: Claudene Simons  MRM: [id number]  : 1955/63 y.o. Date: 3/19/2019    SUBJECTIVE:    This is a 59 y.o.   female with Stage IA, Grade 1 Endometrial Adenocarcinoma. Patient is status post Total laparoscopic hysterectomy and bilateral salpingo-oophorectomy, Laparoscopic resection of left retroperitoneal mass, Bilateral pelvic lymphadenectomy and Cystoscopy 10/21/14. Patient is here today for a 6 month surveillance visit. She denies any abnormal vaginal bleeding, vaginal discharge. Patient also denies abdominal pain, pelvic pain, or abdominal bloating. Patient is voiding without difficulty and bowel movements are regular. Pathology:    10/23/14    A: UTERUS, BILATERAL OVARIES AND FALLOPIAN TUBES, REMOVAL:  COMPLEX ENDOMETRIAL HYPERPLASIA WITH ATYPIA. MILD CHRONIC CERVICITIS. OVARIES AND FALLOPIAN TUBES WITH NO PATHOLOGIC ABNORMALITY. B: LYMPH NODES, LEFT EXTERNAL ILIAC, DISSECTION:  THREE LYMPH NODES, NEGATIVE FOR MALIGNANCY (0/3). C: LYMPH NODES, LEFT OBTURATOR, DISSECTION:  SIX LYMPH NODES, NEGATIVE FOR MALIGNANCY (0/6). D: LYMPH NODES, RIGHT PELVIC, DISSECTION:  FOUR LYMPH NODES, NEGATIVE FOR MALIGNANCY (0/4). Washings, Pelvic  Satisfactory for evaluation. Chronic inflammation present. No malignant cells found. Complete ROS  As above    Medications:     Current Outpatient Medications on File Prior to Visit   Medication Sig Dispense Refill    levocetirizine (XYZAL) 5 mg tablet Take  by mouth daily as needed for Allergies.  pantoprazole (PROTONIX) 40 mg tablet   6    pravastatin (PRAVACHOL) 10 mg tablet Take 10 mg by mouth nightly. Indications: HYPERCHOLESTEROLEMIA      cyclobenzaprine (FLEXERIL) 10 mg tablet Take  by mouth three (3) times daily as needed for Muscle Spasm(s).       naproxen sodium (ALEVE) 220 mg cap Take  by mouth as needed for Pain. No current facility-administered medications on file prior to visit. Past Medical History:   Diagnosis Date    Arthritis     had cortisone shots     Asthma     Cancer ([facility] [address]     uterine    Gastroparesis     GERD (gastroesophageal reflux disease)     duncan's esophagus    Hiatal hernia     Hypercholesterolemia     Nausea & vomiting     Psychiatric disorder     anxiety    Sinusitis 2017     Past Surgical History:   Procedure Laterality Date    HX CHOLECYSTECTOMY      HX DILATION AND CURETTAGE      HX DILATION AND CURETTAGE      HX TONSILLECTOMY  196    HX TUBAL LIGATION       Family History   Problem Relation Age of Onset    Cancer Mother     Cancer Father     Heart Disease Father     Breast Problems Maternal Grandmother      Social History     Tobacco Use    Smoking status: Former Smoker     Types: Cigarettes     Last attempt to quit: 10/14/1998     Years since quittin.4    Smokeless tobacco: Never Used   Substance Use Topics    Alcohol use: No     Comment: occas. 1x a month    Drug use: No       Allergies: Allergies   Allergen Reactions    Acyclovir Hives    Keflex [Cephalexin] Hives and Itching       OBJECTIVE:    Vitals:   Visit Vitals  LMP  (LMP Unknown)       Physical Examination:    General:  alert, cooperative, no distress   Lungs: lungs clear to auscultation   Cardiac: Regular rate and rhythm   Abdomen: soft, bowel sounds active, non-tender   Incision: well healed   Pelvic: NEFG with hypopigmentation and some evidence of lichen sclerosus, normal vaginal mucosa without discharge, cervix uterus and adnexa surgically absent   Rectal: not done   Extremity:   RLE lymphedema improved. No swelling, warmth, erythema, pulses palpable.  LLE normal, atraumatic, no cyanosis or edema     IMPRESSION/PLAN:    Stage IA, Grade 1 Endometrioid adenocarcinoma, JOI  No residual malignancy on final path.  Follow up in 6 months for endometrial cancer surveillance. Pelvic exam performed today  Reviewed signs and symptoms of recurrence. Patient verbalized understanding of information provided today. All questions answered. Patient agrees with plan of care. The patient is advised to call our office with any problems or concerns. Total time spent was 25 minutes regarding diagnosis of endometrial cancer and >50% of this time was spent counseling and coordinating care.       Mikel Bryan DO  Gynecologic Oncology  3/19/2019/3:40 PM

## 2019-03-19 NOTE — PROGRESS NOTES
Mihir Pickering, a 59 y.o. female,  is here for   Chief Complaint   Patient presents with    Uterine Cancer     6 month surveillance       Visit Vitals  /73 (BP 1 Location: Left arm, BP Patient Position: Sitting)   Pulse 74   Temp 95.6 °F (35.3 °C) (Oral)   Resp 16   Ht 5' 1\" (1.549 m)   Wt 84.3 kg (185 lb 12.8 oz)   SpO2 100%   BMI 35.11 kg/m²       Patient denies any persistent or worsening abdominal or pelvic pain. Denies any unusual vaginal bleeding, discharge, irritation, or odor. Denies experiencing any constipation or diarrhea. No burning, discomfort, or irritation with urination. 1. Have you been to the ER, urgent care clinic since your last visit? Hospitalized since your last visit? No    2. Have you seen or consulted any other health care providers outside of the [facility] since your last visit? Include any pap smears or colon screening.  No

## 2019-08-08 ENCOUNTER — TELEPHONE (OUTPATIENT)
Dept: ONCOLOGY | Age: 64
End: 2019-08-08

## 2019-08-08 NOTE — TELEPHONE ENCOUNTER
Left voicemail on home and mobile numbers for patient to call to schedule 6 m f/u in September per chari.

## 2019-09-17 ENCOUNTER — OFFICE VISIT (OUTPATIENT)
Dept: ONCOLOGY | Age: 64
End: 2019-09-17

## 2019-09-17 VITALS
HEART RATE: 64 BPM | SYSTOLIC BLOOD PRESSURE: 173 MMHG | HEIGHT: 61 IN | DIASTOLIC BLOOD PRESSURE: 68 MMHG | RESPIRATION RATE: 12 BRPM | TEMPERATURE: 95.7 F | OXYGEN SATURATION: 98 % | WEIGHT: 183.4 LBS | BODY MASS INDEX: 34.63 KG/M2

## 2019-09-17 DIAGNOSIS — Z85.42 HISTORY OF ENDOMETRIAL CANCER: Primary | ICD-10-CM

## 2019-09-17 RX ORDER — ERGOCALCIFEROL 1.25 MG/1
CAPSULE ORAL
Refills: 0 | COMMUNITY
Start: 2019-06-20

## 2019-09-17 RX ORDER — OMEPRAZOLE 20 MG/1
20 CAPSULE, DELAYED RELEASE ORAL
COMMUNITY
Start: 2019-05-29

## 2019-09-17 NOTE — PROGRESS NOTES
[facility]  [address] [place] [address], [address], [address] [phone]            Patient ID:  Name: Emily Bajwa  MRM: [id number]  : 1955/64 y.o. Date: 2019    SUBJECTIVE:    This is a 62 y.o.   female with Stage IA, Grade 1 Endometrial Adenocarcinoma. Patient is status post Total laparoscopic hysterectomy and bilateral salpingo-oophorectomy, Laparoscopic resection of left retroperitoneal mass, Bilateral pelvic lymphadenectomy and Cystoscopy 10/21/14. Patient is here today for a 6 month surveillance visit. She denies any abnormal vaginal bleeding, vaginal discharge. Patient also denies abdominal pain, pelvic pain, or abdominal bloating. Patient is voiding without difficulty and bowel movements are regular. Pathology:    10/23/14    A: UTERUS, BILATERAL OVARIES AND FALLOPIAN TUBES, REMOVAL:  COMPLEX ENDOMETRIAL HYPERPLASIA WITH ATYPIA. MILD CHRONIC CERVICITIS. OVARIES AND FALLOPIAN TUBES WITH NO PATHOLOGIC ABNORMALITY. B: LYMPH NODES, LEFT EXTERNAL ILIAC, DISSECTION:  THREE LYMPH NODES, NEGATIVE FOR MALIGNANCY (0/3). C: LYMPH NODES, LEFT OBTURATOR, DISSECTION:  SIX LYMPH NODES, NEGATIVE FOR MALIGNANCY (0/6). D: LYMPH NODES, RIGHT PELVIC, DISSECTION:  FOUR LYMPH NODES, NEGATIVE FOR MALIGNANCY (0/4). Washings, Pelvic  Satisfactory for evaluation. Chronic inflammation present. No malignant cells found. Complete ROS  As above    Medications:     Current Outpatient Medications on File Prior to Visit   Medication Sig Dispense Refill    VITAMIN D2 50,000 unit capsule   0    omeprazole (PRILOSEC) 20 mg capsule Take 20 mg by mouth.  pravastatin (PRAVACHOL) 10 mg tablet Take 10 mg by mouth nightly. Indications: HYPERCHOLESTEROLEMIA      cyclobenzaprine (FLEXERIL) 10 mg tablet Take  by mouth three (3) times daily as needed for Muscle Spasm(s).       naproxen sodium (ALEVE) 220 mg cap Take  by mouth as needed for Pain.  levocetirizine (XYZAL) 5 mg tablet Take  by mouth daily as needed for Allergies.  pantoprazole (PROTONIX) 40 mg tablet   6     No current facility-administered medications on file prior to visit. Past Medical History:   Diagnosis Date    Arthritis     had cortisone shots     Asthma     Cancer ([facility] [address]     uterine    Gastroparesis     GERD (gastroesophageal reflux disease)     duncan's esophagus    Hiatal hernia     Hypercholesterolemia     Nausea & vomiting     Psychiatric disorder     anxiety    Sinusitis 2017     Past Surgical History:   Procedure Laterality Date    HX CHOLECYSTECTOMY      HX DILATION AND CURETTAGE      HX DILATION AND CURETTAGE      HX TONSILLECTOMY      HX TUBAL LIGATION       Family History   Problem Relation Age of Onset    Cancer Mother     Cancer Father     Heart Disease Father     Breast Problems Maternal Grandmother      Social History     Tobacco Use    Smoking status: Former Smoker     Types: Cigarettes     Last attempt to quit: 10/14/1998     Years since quittin.9    Smokeless tobacco: Never Used   Substance Use Topics    Alcohol use: Yes     Comment: rarely    Drug use: No       Allergies:      Allergies   Allergen Reactions    Acyclovir Hives    Keflex [Cephalexin] Hives and Itching       OBJECTIVE:    Vitals:   Visit Vitals  /68   Pulse 64   Temp 95.7 °F (35.4 °C) (Oral)   Resp 12   Ht 5' 1\" (1.549 m)   Wt 83.2 kg (183 lb 6.4 oz)   LMP  (LMP Unknown)   SpO2 98%   BMI 34.65 kg/m²       Physical Examination:    General:  alert, cooperative, no distress   Lungs: lungs clear to auscultation   Cardiac: Regular rate and rhythm   Abdomen: soft, bowel sounds active, non-tender   Incision: well healed   Pelvic: NEFG with hypopigmentation and some evidence of lichen sclerosus, normal vaginal mucosa without discharge, cervix uterus and adnexa surgically absent   Rectal: not done   Extremity:   No swelling, warmth, erythema, pulses palpable. LLE normal, atraumatic, no cyanosis or edema     IMPRESSION/PLAN:    Stage IA, Grade 1 Endometrioid adenocarcinoma, JOI  No residual malignancy on final path. Follow up in 1 year for endometrial cancer surveillance. Pelvic exam performed today  Reviewed signs and symptoms of recurrence. Patient verbalized understanding of information provided today. All questions answered. Patient agrees with plan of care. The patient is advised to call our office with any problems or concerns. Total time spent was 25 minutes regarding diagnosis of endometrial cancer and >50% of this time was spent counseling and coordinating care.       Ladora Rubinstein, NP  Gynecologic Oncology  9/17/2019/3:40 PM

## 2019-09-17 NOTE — PROGRESS NOTES
.  ROOM # 1    Chandler Hopson presents today for   Chief Complaint   Patient presents with    Uterine Cancer     6 mo follow up         Chandler Hopson preferred language for health care discussion is english/other. Is someone accompanying this pt? no    Is the patient using any DME equipment during OV? no    Depression Screening:  3 most recent PHQ Screens 9/17/2019 9/10/2018 3/12/2018 9/12/2017   Little interest or pleasure in doing things Not at all Not at all Not at all Not at all   Feeling down, depressed, irritable, or hopeless Not at all Not at all Not at all Not at all   Total Score PHQ 2 0 0 0 0       Learning Assessment:  Learning Assessment 9/12/2017   PRIMARY LEARNER Patient   HIGHEST LEVEL OF EDUCATION - PRIMARY LEARNER  GRADUATED HIGH SCHOOL OR GED   BARRIERS PRIMARY LEARNER NONE   CO-LEARNER CAREGIVER No   PRIMARY LANGUAGE ENGLISH   LEARNER PREFERENCE PRIMARY LISTENING   ANSWERED BY patient   RELATIONSHIP SELF       Abuse Screening:  Abuse Screening Questionnaire 3/19/2019 9/12/2017   Do you ever feel afraid of your partner? N N   Are you in a relationship with someone who physically or mentally threatens you? N N   Is it safe for you to go home? Y Y       Fall Risk  No flowsheet data found. Health Maintenance reviewed and discussed per provider. Yes    Chandler Hopson is due for   Health Maintenance Due   Topic Date Due    Hepatitis C Screening  1955    Shingrix Vaccine Age 50> (1 of 2) 08/24/2006    BREAST CANCER SCRN MAMMOGRAM  08/24/2006    FOBT Q 1 YEAR AGE 50-75  08/24/2006    Influenza Age 5 to Adult  08/01/2019         Please order/place referral if appropriate. Advance Directive:  1. Do you have an advance directive in place? Patient Reply: no    2. If not, would you like material regarding how to put one in place? Patient Reply: no    Coordination of Care:  1. Have you been to the ER, urgent care clinic since your last visit? Hospitalized since your last visit? yes    2. Have you seen or consulted any other health care providers outside of the [address] [place] [address] since your last visit? Include any pap smears or colon screening.  no

## 2019-09-25 PROBLEM — Z12.72 SPECIAL SCREENING FOR MALIGNANT NEOPLASMS, VAGINA: Status: RESOLVED | Noted: 2017-04-20 | Resolved: 2019-09-25

## 2020-08-21 ENCOUNTER — TELEPHONE (OUTPATIENT)
Dept: ONCOLOGY | Age: 65
End: 2020-08-21

## 2020-08-21 NOTE — TELEPHONE ENCOUNTER
Spoke with patient to reschedule appointment on 9/1/2020 due to provider being out of office. Patient will call office to reschedule, she states she has lost her job and has no health insurance at this time.

## 2021-08-17 ENCOUNTER — OFFICE VISIT (OUTPATIENT)
Dept: ONCOLOGY | Age: 66
End: 2021-08-17

## 2021-08-17 VITALS
WEIGHT: 170.6 LBS | SYSTOLIC BLOOD PRESSURE: 129 MMHG | BODY MASS INDEX: 32.21 KG/M2 | HEIGHT: 61 IN | HEART RATE: 76 BPM | DIASTOLIC BLOOD PRESSURE: 69 MMHG | TEMPERATURE: 97.1 F | OXYGEN SATURATION: 97 %

## 2021-08-17 DIAGNOSIS — Z85.42 HISTORY OF ENDOMETRIAL CANCER: Primary | ICD-10-CM

## 2021-08-17 PROCEDURE — 99213 OFFICE O/P EST LOW 20 MIN: CPT | Performed by: OBSTETRICS & GYNECOLOGY

## 2021-08-17 NOTE — PROGRESS NOTES
[facility]  [address] [place] [address], [address], [address] [facility]   [phone]            Patient ID:  Name: Georgia Chaparro  MRM: [id number]  : 1955/65 y.o. Date: 2021    SUBJECTIVE:    This is a 60 y.o.   female with Stage IA, Grade 1 Endometrial Adenocarcinoma. Patient is status post Total laparoscopic hysterectomy and bilateral salpingo-oophorectomy, Laparoscopic resection of left retroperitoneal mass, Bilateral pelvic lymphadenectomy and Cystoscopy 10/21/14. Patient is here today for a 12 month surveillance visit. She denies any abnormal vaginal bleeding, vaginal discharge. Patient also denies abdominal pain, pelvic pain, or abdominal bloating. Patient is voiding without difficulty and bowel movements are regular. Pathology:    10/23/14    A: UTERUS, BILATERAL OVARIES AND FALLOPIAN TUBES, REMOVAL:  COMPLEX ENDOMETRIAL HYPERPLASIA WITH ATYPIA. MILD CHRONIC CERVICITIS. OVARIES AND FALLOPIAN TUBES WITH NO PATHOLOGIC ABNORMALITY. B: LYMPH NODES, LEFT EXTERNAL ILIAC, DISSECTION:  THREE LYMPH NODES, NEGATIVE FOR MALIGNANCY (0/3). C: LYMPH NODES, LEFT OBTURATOR, DISSECTION:  SIX LYMPH NODES, NEGATIVE FOR MALIGNANCY (0/6). D: LYMPH NODES, RIGHT PELVIC, DISSECTION:  FOUR LYMPH NODES, NEGATIVE FOR MALIGNANCY (0/4). Washings, Pelvic  Satisfactory for evaluation. Chronic inflammation present. No malignant cells found. Complete ROS  As above    Medications:     Current Outpatient Medications on File Prior to Visit   Medication Sig Dispense Refill    VITAMIN D2 50,000 unit capsule   0    omeprazole (PRILOSEC) 20 mg capsule Take 20 mg by mouth.  cyclobenzaprine (FLEXERIL) 10 mg tablet Take  by mouth three (3) times daily as needed for Muscle Spasm(s).  naproxen sodium (ALEVE) 220 mg cap Take  by mouth as needed for Pain.       levocetirizine (XYZAL) 5 mg tablet Take  by mouth daily as needed for Allergies. (Patient not taking: Reported on 2021)      pantoprazole (PROTONIX) 40 mg tablet  (Patient not taking: Reported on 2021)  6    pravastatin (PRAVACHOL) 10 mg tablet Take 10 mg by mouth nightly. Indications: HYPERCHOLESTEROLEMIA (Patient not taking: Reported on 2021)       No current facility-administered medications on file prior to visit. Past Medical History:   Diagnosis Date    Arthritis     had cortisone shots     Asthma     Cancer ([facility] [address]     uterine    Gastroparesis     GERD (gastroesophageal reflux disease)     duncan's esophagus    Hiatal hernia     Hypercholesterolemia     Nausea & vomiting     Psychiatric disorder     anxiety    Sinusitis 2017     Past Surgical History:   Procedure Laterality Date    HX CHOLECYSTECTOMY      HX DILATION AND CURETTAGE      HX DILATION AND CURETTAGE      HX TONSILLECTOMY      HX TUBAL LIGATION       Family History   Problem Relation Age of Onset    Cancer Mother     Cancer Father     Heart Disease Father     Breast Problems Maternal Grandmother      Social History     Tobacco Use    Smoking status: Former Smoker     Types: Cigarettes     Quit date: 10/14/1998     Years since quittin.8    Smokeless tobacco: Never Used   Substance Use Topics    Alcohol use: Yes     Comment: rarely    Drug use: No       Allergies:      Allergies   Allergen Reactions    Acyclovir Hives    Keflex [Cephalexin] Hives and Itching       OBJECTIVE:    Vitals:   Visit Vitals  /69 (BP 1 Location: Left upper arm, BP Patient Position: Sitting, BP Cuff Size: Large adult)   Pulse 76   Temp 97.1 °F (36.2 °C) (Oral)   Ht 5' 1\" (1.549 m)   Wt 77.4 kg (170 lb 9.6 oz)   LMP  (LMP Unknown)   SpO2 97%   BMI 32.23 kg/m²       Physical Examination:    General:  alert, cooperative, no distress   Abdomen: soft, , non-tender   Incision: well healed   Pelvic: NEFG with hypopigmentation and some evidence of lichen sclerosus, normal vaginal mucosa without discharge, cervix uterus and adnexa surgically absent   Rectal: not done   Extremity:   No swelling, warmth, erythema, pulses palpable. LLE normal, atraumatic, no cyanosis or edema     IMPRESSION/PLAN:    Stage IA, Grade 1 Endometrioid adenocarcinoma, JOI  No residual malignancy on final path. Follow up in 1 year for endometrial cancer surveillance. Pelvic exam performed today  Reviewed signs and symptoms of recurrence. Patient verbalized understanding of information provided today. All questions answered. Patient agrees with plan of care. The patient is advised to call our office with any problems or concerns. Total time spent was 25 minutes regarding diagnosis of endometrial cancer and >50% of this time was spent counseling and coordinating care.       Arely Li MD  Gynecologic Oncology  8/17/2021/3:40 PM

## 2021-08-17 NOTE — PROGRESS NOTES
Boston Latif is a 60 y.o. female presents in office for surveillance endometrial cancer. Chief Complaint   Patient presents with    Follow-up      Pt has no complaints  Do you have any unusual vaginal bleeding, discharge or irritation? no  Do you have any changes in your bowel movements? no  Have you been experiencing nausea or vomiting? no  Have you been experiencing any continuous or worsening abdominal pain? no  Any urinary burning? no    Visit Vitals  /69 (BP 1 Location: Left upper arm, BP Patient Position: Sitting, BP Cuff Size: Large adult)   Pulse 76   Temp 97.1 °F (36.2 °C) (Oral)   Ht 5' 1\" (1.549 m)   Wt 170 lb 9.6 oz (77.4 kg)   SpO2 97%   BMI 32.23 kg/m²         1. Have you been to the ER, urgent care clinic since your last visit? Hospitalized since your last visit? No    2. Have you seen or consulted any other health care providers outside of the [address] [place] [address] since your last visit? Include any pap smears or colon screening. No    3 most recent PHQ Screens 8/17/2021   Little interest or pleasure in doing things Not at all   Feeling down, depressed, irritable, or hopeless Not at all   Total Score PHQ 2 0       Abuse Screening Questionnaire 3/19/2019   Do you ever feel afraid of your partner? N   Are you in a relationship with someone who physically or mentally threatens you? N   Is it safe for you to go home? Y       No flowsheet data found.     Learning Assessment 9/12/2017   PRIMARY LEARNER Patient   HIGHEST LEVEL OF EDUCATION - PRIMARY LEARNER  GRADUATED HIGH SCHOOL OR GED   BARRIERS PRIMARY LEARNER NONE   CO-LEARNER CAREGIVER No   PRIMARY LANGUAGE ENGLISH   LEARNER PREFERENCE PRIMARY LISTENING   ANSWERED BY patient   RELATIONSHIP SELF

## 2021-08-17 NOTE — LETTER
8/17/2021    Patient: Radha Butts   YOB: 1955   Date of Visit: 8/17/2021     Chao Houser MD  [address] [place] [address] Via [facility] [phone]  Via Fax: [phone]    Dear Chao Houser MD,      Thank you for referring Ms. Radha Butts to [facility] for evaluation. My notes for this consultation are attached. If you have questions, please do not hesitate to call me. I look forward to following your patient along with you.       Sincerely,    Jennie Aguilera MD

## 2022-03-18 PROBLEM — Z85.42 PERSONAL HISTORY OF MALIGNANT NEOPLASM OF OTHER PARTS OF UTERUS: Status: ACTIVE | Noted: 2017-04-20

## 2022-03-19 PROBLEM — Z90.710 VAGINAL PAP SMEAR FOLLOWING HYSTERECTOMY FOR MALIGNANCY: Status: ACTIVE | Noted: 2017-04-20

## 2022-03-19 PROBLEM — Z08 VAGINAL PAP SMEAR FOLLOWING HYSTERECTOMY FOR MALIGNANCY: Status: ACTIVE | Noted: 2017-04-20

## 2022-05-17 ENCOUNTER — TELEPHONE (OUTPATIENT)
Dept: ONCOLOGY | Age: 67
End: 2022-05-17

## 2025-05-23 ENCOUNTER — APPOINTMENT (OUTPATIENT)
Facility: HOSPITAL | Age: 70
End: 2025-05-23
Payer: MEDICARE

## 2025-05-23 ENCOUNTER — HOSPITAL ENCOUNTER (EMERGENCY)
Facility: HOSPITAL | Age: 70
Discharge: HOME | End: 2025-05-23
Attending: EMERGENCY MEDICINE
Payer: MEDICARE

## 2025-05-23 VITALS
SYSTOLIC BLOOD PRESSURE: 110 MMHG | BODY MASS INDEX: 32.98 KG/M2 | TEMPERATURE: 98.7 F | HEART RATE: 100 BPM | OXYGEN SATURATION: 100 % | RESPIRATION RATE: 17 BRPM | HEIGHT: 60 IN | DIASTOLIC BLOOD PRESSURE: 92 MMHG | WEIGHT: 168 LBS
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
## 2025-05-23 DIAGNOSIS — S01.01XA LACERATION OF SCALP, INITIAL ENCOUNTER: ICD-10-CM

## 2025-05-23 DIAGNOSIS — T14.8XXA ABRASION: ICD-10-CM

## 2025-05-23 PROCEDURE — 99284 EMERGENCY DEPT VISIT MOD MDM: CPT

## 2025-05-23 PROCEDURE — 6370000000 HC RX 637 (ALT 250 FOR IP): Performed by: EMERGENCY MEDICINE

## 2025-05-23 PROCEDURE — 72125 CT NECK SPINE W/O DYE: CPT

## 2025-05-23 PROCEDURE — 73140 X-RAY EXAM OF FINGER(S): CPT

## 2025-05-23 PROCEDURE — 71250 CT THORAX DX C-: CPT

## 2025-05-23 PROCEDURE — 70450 CT HEAD/BRAIN W/O DYE: CPT

## 2025-05-23 PROCEDURE — 12052 INTMD RPR FACE/MM 2.6-5.0 CM: CPT

## 2025-05-23 RX ORDER — ACETAMINOPHEN 325 MG/1
650 TABLET ORAL ONCE
Status: COMPLETED | OUTPATIENT
Start: 2025-05-23 | End: 2025-05-23

## 2025-05-23 RX ADMIN — ACETAMINOPHEN 650 MG: 325 TABLET ORAL at 18:32

## 2025-05-23 ASSESSMENT — PAIN DESCRIPTION - ORIENTATION
ORIENTATION: RIGHT
ORIENTATION: RIGHT

## 2025-05-23 ASSESSMENT — PAIN DESCRIPTION - DESCRIPTORS
DESCRIPTORS: THROBBING;TENDER
DESCRIPTORS: ACHING

## 2025-05-23 ASSESSMENT — PAIN DESCRIPTION - LOCATION
LOCATION: HEAD;HIP;SHOULDER
LOCATION: HEAD;HIP

## 2025-05-23 ASSESSMENT — PAIN SCALES - GENERAL
PAINLEVEL_OUTOF10: 8
PAINLEVEL_OUTOF10: 8

## 2025-05-23 ASSESSMENT — PAIN - FUNCTIONAL ASSESSMENT: PAIN_FUNCTIONAL_ASSESSMENT: 0-10

## 2025-05-23 NOTE — ED PROVIDER NOTES
[facility]  EMERGENCY DEPARTMENT ENCOUNTER    Patient Name: Sandra Perry  MRN: [id number]  YOB: 1955  Provider: TAPAN Hobbs MD am the primary clinician of record.  Time/Date of evaluation: 4:39 PM EDT on 25    History of Presenting Illness     History provided by: Patient  History is limited by: Nothing   Evaluated in room: 12    Chief Complaint: Fall    HISTORY:  Sandra Perry is a 69 y.o. female presenting after a fall that happened earlier today.  She was going upstairs when her dog pulled her on the leash and she fell hitting her head.  No loss of consciousness.  She is not on blood thinners.  Complains of some right hip pain, generally to the pains as well as laceration to the head.  She went to urgent care but due to brisk bleeding they sent her to the Emergency Department for evaluation    Nursing Notes were all reviewed and agreed with or any disagreements were addressed in the HPI.    Past History     PAST MEDICAL HISTORY:  Past Medical History:   Diagnosis Date    Arthritis     had cortisone shots     Asthma     Cancer (HCC)     uterine    Gastroparesis     GERD (gastroesophageal reflux disease)     hua's esophagus    Hiatal hernia     Hypercholesterolemia     Nausea & vomiting     Psychiatric disorder     anxiety    Sinusitis 2017       PAST SURGICAL HISTORY:  Past Surgical History:   Procedure Laterality Date    CHOLECYSTECTOMY      DILATION AND CURETTAGE OF UTERUS      DILATION AND CURETTAGE OF UTERUS      TONSILLECTOMY      TUBAL LIGATION         FAMILY HISTORY:  Family History   Problem Relation Age of Onset    Breast Problems Maternal Grandmother     Cancer Mother     Cancer Father     Heart Disease Father        SOCIAL HISTORY:  Social History     Tobacco Use    Smoking status: Former     Current packs/day: 0.00     Types: Cigarettes     Quit date: 10/14/1998     Years since quittin.6    Smokeless tobacco: Never

## 2025-05-23 NOTE — ED TRIAGE NOTES
Pt arrives via EMS c/o fall and laceration tp R side of the back of the head. Pt states she had the dog on a leash when the dog pulled the leash and she fell down the stairs. Pt denies LOC and denies blood thinners.     Pt also c/o R hip pain. Pt states she landed on the R side when she fell down the stairs.     Wrapped applied to head by EMS and bleeding controlled at this time.

## 2025-06-03 ENCOUNTER — HOSPITAL ENCOUNTER (EMERGENCY)
Facility: HOSPITAL | Age: 70
Discharge: HOME | End: 2025-06-03

## 2025-06-03 VITALS
DIASTOLIC BLOOD PRESSURE: 81 MMHG | TEMPERATURE: 97.5 F | RESPIRATION RATE: 16 BRPM | HEART RATE: 70 BPM | OXYGEN SATURATION: 97 % | SYSTOLIC BLOOD PRESSURE: 165 MMHG

## 2025-06-03 DIAGNOSIS — Z48.02 VISIT FOR SUTURE REMOVAL: Primary | ICD-10-CM

## 2025-06-03 NOTE — ED PROVIDER NOTES
[facility]  EMERGENCY DEPARTMENT ENCOUNTER       Pt Name: Sandra Peryr  MRN: [id number]  Birthdate 1955  Date of evaluation: 6/3/2025  PCP: Dagoberto Hutton MD  Note Started: 10:10 AM 6/3/25     CHIEF COMPLAINT       Chief Complaint   Patient presents with    Suture / Staple Removal        HISTORY OF PRESENT ILLNESS: 1 or more elements      History From: Patient  HPI Limitations: None  Chronic Conditions: asthma, GERD, gastroparesis, HLP  Social Determinants affecting Dx or Tx: none      Sandra Perry is a 69 y.o. female who presents to ED c/o suture removal. Pt has running suture placed to occipital scalp at this facility on 25. Pt notes healing well with no complaints. No drainage or swelling     Nursing Notes were all reviewed and agreed with or any disagreements were addressed in the HPI.    PAST HISTORY     Past Medical History:  Past Medical History:   Diagnosis Date    Arthritis     had cortisone shots     Asthma     Cancer (HCC)     uterine    Gastroparesis     GERD (gastroesophageal reflux disease)     hua's esophagus    Hiatal hernia     Hypercholesterolemia     Nausea & vomiting     Psychiatric disorder     anxiety    Sinusitis 2017       Past Surgical History:  Past Surgical History:   Procedure Laterality Date    CHOLECYSTECTOMY      DILATION AND CURETTAGE OF UTERUS      DILATION AND CURETTAGE OF UTERUS      TONSILLECTOMY  196    TUBAL LIGATION         Family History:  Family History   Problem Relation Age of Onset    Breast Problems Maternal Grandmother     Cancer Mother     Cancer Father     Heart Disease Father        Social History:  Social History     Socioeconomic History    Marital status:    Tobacco Use    Smoking status: Former     Current packs/day: 0.00     Types: Cigarettes     Quit date: 10/14/1998     Years since quittin.6    Smokeless tobacco: Never   Substance and Sexual Activity    Alcohol use: Yes    Drug use: No